# Patient Record
Sex: FEMALE
[De-identification: names, ages, dates, MRNs, and addresses within clinical notes are randomized per-mention and may not be internally consistent; named-entity substitution may affect disease eponyms.]

---

## 2020-08-06 PROBLEM — Z00.00 ENCOUNTER FOR PREVENTIVE HEALTH EXAMINATION: Status: ACTIVE | Noted: 2020-08-06

## 2020-08-07 ENCOUNTER — APPOINTMENT (OUTPATIENT)
Dept: BARIATRICS | Facility: CLINIC | Age: 40
End: 2020-08-07
Payer: COMMERCIAL

## 2020-08-07 VITALS — HEIGHT: 62 IN | WEIGHT: 243 LBS | BODY MASS INDEX: 44.72 KG/M2

## 2020-08-07 DIAGNOSIS — E66.01 MORBID (SEVERE) OBESITY DUE TO EXCESS CALORIES: ICD-10-CM

## 2020-08-07 PROCEDURE — 99202 OFFICE O/P NEW SF 15 MIN: CPT | Mod: 95

## 2020-08-07 NOTE — HISTORY OF PRESENT ILLNESS
[Medical Office: ([facility])___] : at the medical office located in  [Home] : at home, [unfilled] , at the time of the visit. [Other:____] : [unfilled] [de-identified] : Manjula is a 39 year old female, who 7 years ago had laparoscopic gastric bypass at another center, weighed 340 pounds, down to 150, now to 243 pounds. She attributes weight gain to having a child. Additionally, she has been followed for profound anemia. Today, discussed possible treatments. Suggest before contemplating revision, which would only increase malabsorption and increase anemia, potential bone loss, that she undergo dietary modification and consider medical therapy, potentially with Saxenda, or GLP agonist, which would be a great choice in someone with recurrent diabetes Following RYGB. Will have her work with our medical weight loss team. If she is not successful, will re-evaluate for revision. \par \par

## 2020-08-07 NOTE — ASSESSMENT
[FreeTextEntry1] : Patient s/p gastric bypass. Successful weight loss, but has since regained. Recommended seeing medical weight loss team before reconsidering surgery.